# Patient Record
Sex: FEMALE | Race: AMERICAN INDIAN OR ALASKA NATIVE
[De-identification: names, ages, dates, MRNs, and addresses within clinical notes are randomized per-mention and may not be internally consistent; named-entity substitution may affect disease eponyms.]

---

## 2021-03-30 ENCOUNTER — HOSPITAL ENCOUNTER (EMERGENCY)
Dept: HOSPITAL 5 - ED | Age: 26
Discharge: HOME | End: 2021-03-30
Payer: MEDICAID

## 2021-03-30 VITALS — DIASTOLIC BLOOD PRESSURE: 82 MMHG | SYSTOLIC BLOOD PRESSURE: 115 MMHG

## 2021-03-30 DIAGNOSIS — Z79.899: ICD-10-CM

## 2021-03-30 DIAGNOSIS — O26.891: Primary | ICD-10-CM

## 2021-03-30 DIAGNOSIS — Z3A.00: ICD-10-CM

## 2021-03-30 DIAGNOSIS — R11.0: ICD-10-CM

## 2021-03-30 LAB
ALBUMIN SERPL-MCNC: 4 G/DL (ref 3.9–5)
ALT SERPL-CCNC: 9 UNITS/L (ref 7–56)
BASOPHILS # (AUTO): 0.1 K/MM3 (ref 0–0.1)
BASOPHILS NFR BLD AUTO: 0.7 % (ref 0–1.8)
BILIRUB UR QL STRIP: (no result)
BLOOD UR QL VISUAL: (no result)
BUN SERPL-MCNC: 6 MG/DL (ref 7–17)
BUN/CREAT SERPL: 9 %
CALCIUM SERPL-MCNC: 9.4 MG/DL (ref 8.4–10.2)
EOSINOPHIL # BLD AUTO: 0.2 K/MM3 (ref 0–0.4)
EOSINOPHIL NFR BLD AUTO: 1.7 % (ref 0–4.3)
HCT VFR BLD CALC: 41.4 % (ref 30.3–42.9)
HEMOLYSIS INDEX: 9
HGB BLD-MCNC: 13.6 GM/DL (ref 10.1–14.3)
LYMPHOCYTES # BLD AUTO: 3.6 K/MM3 (ref 1.2–5.4)
LYMPHOCYTES NFR BLD AUTO: 32.1 % (ref 13.4–35)
MCHC RBC AUTO-ENTMCNC: 33 % (ref 30–34)
MCV RBC AUTO: 79 FL (ref 79–97)
MONOCYTES # (AUTO): 0.9 K/MM3 (ref 0–0.8)
MONOCYTES % (AUTO): 7.6 % (ref 0–7.3)
PH UR STRIP: 7 [PH] (ref 5–7)
PLATELET # BLD: 294 K/MM3 (ref 140–440)
PROT UR STRIP-MCNC: (no result) MG/DL
RBC # BLD AUTO: 5.26 M/MM3 (ref 3.65–5.03)
RBC #/AREA URNS HPF: 1 /HPF (ref 0–6)
UROBILINOGEN UR-MCNC: < 2 MG/DL (ref ?–2)
WBC #/AREA URNS HPF: < 1 /HPF (ref 0–6)

## 2021-03-30 PROCEDURE — 85025 COMPLETE CBC W/AUTO DIFF WBC: CPT

## 2021-03-30 PROCEDURE — 80053 COMPREHEN METABOLIC PANEL: CPT

## 2021-03-30 PROCEDURE — 81001 URINALYSIS AUTO W/SCOPE: CPT

## 2021-03-30 PROCEDURE — 84703 CHORIONIC GONADOTROPIN ASSAY: CPT

## 2021-03-30 PROCEDURE — 83690 ASSAY OF LIPASE: CPT

## 2021-03-30 PROCEDURE — 36415 COLL VENOUS BLD VENIPUNCTURE: CPT

## 2021-03-30 NOTE — EMERGENCY DEPARTMENT REPORT
ED N/V/D HPI





- General


Chief complaint: Nausea/Vomiting/Diarrhea


Stated complaint: CHEST PAINS LT SIDE X7 DAYS


Time Seen by Provider: 03/30/21 16:33


Source: patient


Mode of arrival: Ambulatory


Limitations: No Limitations





- History of Present Illness


Initial comments: 





25-year-old obese F American female presents to the emergency department 

complaining of 1 to 2 weeks history of waxing and waning nausea with an 

occasional episode of vomiting.  States that it always preceded about 1 or 2 

weeks ago with a couple episodes of diarrhea which was associated with 

epigastric pain which lasted for about 1 to 1-day and then resolve however she 

is been unable to get rid of the continuous waves of nausea.  She states that 

she normally only feels like this when she is pregnant and had a suspicion 

today.  She reports no chest pain, no palpitations no current abdominal pain, no

fever, chills, sweats, hemoptysis, hematemesis hematochezia, dysuria, or 

hematuria.  She is also been experiencing headaches which has been associated 

with the nausea as well she reports no trauma, no blurred vision, no known 

hypertension, no visual disturbances, no scotomas, no ear pain or sore throat.


MD complaint: nausea


Associated Abdominal Pain: No


Radiation: none


Associated Symptoms: nausea/vomiting.  denies: myalgias, chest pain, cough, 

fever/chills, loss of appetite, shortness of breath, syncope, other





- Related Data


                                  Previous Rx's











 Medication  Instructions  Recorded  Last Taken  Type


 


Doxylamine Succinate/Vit B6 1 each PO TID #20 tablet. 03/30/21 Unknown Rx





[Diclegis Dr 10-10 mg Tablet]    











                                    Allergies











Allergy/AdvReac Type Severity Reaction Status Date / Time


 


No Known Allergies Allergy   Unverified 03/30/21 16:02














ED Review of Systems


ROS: 


Stated complaint: CHEST PAINS LT SIDE X7 DAYS


Other details as noted in HPI





Comment: All other systems reviewed and negative





ED Past Medical Hx





- Medications


Home Medications: 


                                Home Medications











 Medication  Instructions  Recorded  Confirmed  Last Taken  Type


 


Doxylamine Succinate/Vit B6 1 each PO TID #20 tablet. 03/30/21  Unknown Rx





[Diclegis Dr 10-10 mg Tablet]     














ED Physical Exam





- General


Limitations: No Limitations


General appearance: alert, in no apparent distress





- Head


Head exam: Present: atraumatic, normocephalic





- Eye


Eye exam: Present: normal appearance, PERRL, EOMI


Pupils: Present: normal accommodation





- ENT


ENT exam: Present: normal exam, normal orophraynx, mucous membranes moist





- Neck


Neck exam: Present: normal inspection





- Respiratory


Respiratory exam: Present: normal lung sounds bilaterally.  Absent: respiratory 

distress, wheezes, rhonchi





- Cardiovascular


Cardiovascular Exam: Present: regular rate, normal rhythm.  Absent: systolic 

murmur, diastolic murmur, rubs, gallop





- GI/Abdominal


GI/Abdominal exam: Present: soft, normal bowel sounds, other (No Javed sign, no

Rovsing sign, no Dominguez Rdz, no tenderness at McBurney's.  No suprapubic pain 

no radiating radiating pain to the shoulder).  Absent: tenderness, guarding, 

rebound, hyperactive bowel sounds, hypoactive bowel sounds, organomegaly, mass





- Extremities Exam


Extremities exam: Present: normal inspection, normal capillary refill





- Back Exam


Back exam: Present: normal inspection.  Absent: CVA tenderness (R), CVA 

tenderness (L)





- Neurological Exam


Neurological exam: Present: alert, oriented X3, CN II-XII intact, normal gait





- Psychiatric


Psychiatric exam: Present: normal affect, normal mood.  Absent: anxious, flat 

affect, homicidal ideation





- Skin


Skin exam: Present: warm, dry, intact, normal color.  Absent: rash, diaphoretic,

erythema, urticaria, petechiae





ED Course


                                   Vital Signs











  03/30/21 03/30/21





  16:01 19:44


 


Temperature 98.8 F 98.1 F


 


Pulse Rate 80 84


 


Respiratory 16 18





Rate  


 


Blood Pressure  115/82


 


Blood Pressure 125/74 





[Left]  


 


O2 Sat by Pulse 100 100





Oximetry  














ED Medical Decision Making





- Lab Data


Result diagrams: 


                                 03/30/21 16:36





                                 03/30/21 16:36


Critical care attestation.: 


If time is entered above; I have spent that time in minutes in the direct care 

of this critically ill patient, excluding procedure time.








ED Disposition


Clinical Impression: 


 Nausea, Pregnancy, Positive pregnancy test





Disposition: DC-01 TO HOME OR SELFCARE


Is pt being admited?: No


Does the pt Need Aspirin: No


Condition: Stable


Instructions:  Nausea, Adult, First Trimester of Pregnancy, Easy-to-Read, P

renatal Care, How a Baby Grows During Pregnancy


Prescriptions: 


Doxylamine Succinate/Vit B6 [Diclegis Dr 10-10 mg Tablet] 1 each PO TID #20 

tablet.


Referrals: 


[facility] [Provider Group] - 3-5 Days


PRIMARY CARE,MD [Primary Care Provider] - 3-5 Days

## 2021-03-30 NOTE — EVENT NOTE
ED Screening Note


ED Screening Note: 





epigastric abd discomfort


back pain 


nausea, one episode of vomiting 


PMHx sickle cell trait


no allergies to meds 


LNMP: currently on cycle 





This initial assessment/diagnostic orders/clinical plan/treatment(s) is/are 

subject to change based on patients health status, clinical progression and 

re-assessment by fellow clinical providers in the ED. Further treatment and 

workup at subsequent clinical providers discretion. Patient/guardian urged not 

to elope from the ED as their condition may be serious if not clinically 

assessed and managed. 





Initial orders include: 


labs, UA

## 2021-04-03 ENCOUNTER — HOSPITAL ENCOUNTER (EMERGENCY)
Dept: HOSPITAL 5 - ED | Age: 26
Discharge: HOME | End: 2021-04-03
Payer: MEDICAID

## 2021-04-03 VITALS — SYSTOLIC BLOOD PRESSURE: 126 MMHG | DIASTOLIC BLOOD PRESSURE: 62 MMHG

## 2021-04-03 DIAGNOSIS — Z3A.01: ICD-10-CM

## 2021-04-03 DIAGNOSIS — Z79.899: ICD-10-CM

## 2021-04-03 DIAGNOSIS — Z98.890: ICD-10-CM

## 2021-04-03 DIAGNOSIS — F17.200: ICD-10-CM

## 2021-04-03 DIAGNOSIS — O20.0: Primary | ICD-10-CM

## 2021-04-03 LAB
ALBUMIN SERPL-MCNC: 4.1 G/DL (ref 3.9–5)
ALT SERPL-CCNC: 9 UNITS/L (ref 7–56)
BASOPHILS # (AUTO): 0 K/MM3 (ref 0–0.1)
BASOPHILS NFR BLD AUTO: 0.4 % (ref 0–1.8)
BILIRUB UR QL STRIP: (no result)
BLOOD UR QL VISUAL: (no result)
BUN SERPL-MCNC: 6 MG/DL (ref 7–17)
BUN/CREAT SERPL: 9 %
CALCIUM SERPL-MCNC: 9.4 MG/DL (ref 8.4–10.2)
EOSINOPHIL # BLD AUTO: 0.1 K/MM3 (ref 0–0.4)
EOSINOPHIL NFR BLD AUTO: 0.4 % (ref 0–4.3)
HCT VFR BLD CALC: 40.7 % (ref 30.3–42.9)
HEMOLYSIS INDEX: 0
HGB BLD-MCNC: 13.6 GM/DL (ref 10.1–14.3)
LYMPHOCYTES # BLD AUTO: 2 K/MM3 (ref 1.2–5.4)
LYMPHOCYTES NFR BLD AUTO: 14.9 % (ref 13.4–35)
MCHC RBC AUTO-ENTMCNC: 34 % (ref 30–34)
MCV RBC AUTO: 77 FL (ref 79–97)
MONOCYTES # (AUTO): 0.9 K/MM3 (ref 0–0.8)
MONOCYTES % (AUTO): 6.9 % (ref 0–7.3)
MUCOUS THREADS #/AREA URNS HPF: (no result) /HPF
PH UR STRIP: 6 [PH] (ref 5–7)
PLATELET # BLD: 290 K/MM3 (ref 140–440)
RBC # BLD AUTO: 5.26 M/MM3 (ref 3.65–5.03)
RBC #/AREA URNS HPF: 2 /HPF (ref 0–6)
UROBILINOGEN UR-MCNC: < 2 MG/DL (ref ?–2)
WBC #/AREA URNS HPF: 3 /HPF (ref 0–6)

## 2021-04-03 PROCEDURE — 96361 HYDRATE IV INFUSION ADD-ON: CPT

## 2021-04-03 PROCEDURE — 99284 EMERGENCY DEPT VISIT MOD MDM: CPT

## 2021-04-03 PROCEDURE — 80053 COMPREHEN METABOLIC PANEL: CPT

## 2021-04-03 PROCEDURE — 76801 OB US < 14 WKS SINGLE FETUS: CPT

## 2021-04-03 PROCEDURE — 84702 CHORIONIC GONADOTROPIN TEST: CPT

## 2021-04-03 PROCEDURE — 81001 URINALYSIS AUTO W/SCOPE: CPT

## 2021-04-03 PROCEDURE — 83690 ASSAY OF LIPASE: CPT

## 2021-04-03 PROCEDURE — 83735 ASSAY OF MAGNESIUM: CPT

## 2021-04-03 PROCEDURE — 85025 COMPLETE CBC W/AUTO DIFF WBC: CPT

## 2021-04-03 PROCEDURE — 96374 THER/PROPH/DIAG INJ IV PUSH: CPT

## 2021-04-03 PROCEDURE — 36415 COLL VENOUS BLD VENIPUNCTURE: CPT

## 2021-04-03 NOTE — EMERGENCY DEPARTMENT REPORT
ED General Adult HPI





- General


Chief complaint: Nausea/Vomiting/Diarrhea


Stated complaint: PREGNANT,VOMITING/SEEN HERE 2 DAYS AGO


Time Seen by Provider: 21 12:26


Source: patient


Mode of arrival: Ambulatory


Limitations: No Limitations





- History of Present Illness


Initial comments: 





25-year-old female presents to the emergency room stating that she is pregnant 

she was had a positive pregnancy at this ER on 3/30/2021.  She states that she 

has had ongoing nausea fat now with vaginal bleeding which she describes as 

spotting not enough for her to use pad over the last 2 weeks she states the 

nausea causes her chest to hurt especially when she feels like she has to vomit.

 She denies any other symptoms no fever no coughing no shortness of breath.  She

is   5 para 3 AB 1.  She has not followed up with her PCP as yet


-: week(s)


Location: chest


Radiation: non-radiation


Worsens with: none


Associated Symptoms: nausea/vomiting.  denies: confusion, cough, fever/chills, 

loss of appetite, shortness of breath, syncope, weakness


Treatments Prior to Arrival: none





- Related Data


                                  Previous Rx's











 Medication  Instructions  Recorded  Last Taken  Type


 


Doxylamine Succinate/Vit B6 1 each PO TID #20 tablet. 21 Unknown Rx





[Diclegis Dr 10-10 mg Tablet]    


 


Ondansetron [Zofran Odt] 4 mg PO Q8HR PRN #20 tab.rebekadis 21 Unknown Rx











                                    Allergies











Allergy/AdvReac Type Severity Reaction Status Date / Time


 


No Known Allergies Allergy   Unverified 21 16:02














ED Review of Systems


ROS: 


Stated complaint: PREGNANT,VOMITING/SEEN HERE 2 DAYS AGO


Other details as noted in HPI








ED Past Medical Hx





- Past Medical History


Previous Medical History?: No





- Surgical History


Additional Surgical History: 





- Social History


Smoking Status: Current Every Day Smoker





- Medications


Home Medications: 


                                Home Medications











 Medication  Instructions  Recorded  Confirmed  Last Taken  Type


 


Doxylamine Succinate/Vit B6 1 each PO TID #20 tablet. 21  Unknown Rx





[Diclegis Dr 10-10 mg Tablet]     


 


Ondansetron [Zofran Odt] 4 mg PO Q8HR PRN #20 tab.dalton 21  Unknown Rx














ED Physical Exam





- General


Limitations: No Limitations


General appearance: alert, in no apparent distress





- Head


Head exam: Present: atraumatic, normal inspection





- Eye


Eye exam: Present: normal appearance





- ENT


ENT exam: Present: normal exam





- Neck


Neck exam: Present: normal inspection





- Respiratory


Respiratory exam: Present: normal lung sounds bilaterally, chest wall tenderness

(Anterior chest wall pain with palpation).  Absent: respiratory distress, 

wheezes





- Cardiovascular


Cardiovascular Exam: Present: regular rate, normal heart sounds





- GI/Abdominal


GI/Abdominal exam: Present: soft, normal bowel sounds.  Absent: distended, 

tenderness, guarding





- Extremities Exam


Extremities exam: Present: normal inspection, normal capillary refill





- Back Exam


Back exam: Present: normal inspection.  Absent: CVA tenderness (R), CVA 

tenderness (L)





- Neurological Exam


Neurological exam: Present: alert, oriented X3, normal gait





- Psychiatric


Psychiatric exam: Present: normal affect





- Skin


Skin exam: Present: warm, dry, intact





ED Course


                                   Vital Signs











  21





  12:10 17:13


 


Temperature 98.0 F 


 


Pulse Rate 105 H 82


 


Respiratory 18 18





Rate  


 


Blood Pressure 129/60 


 


Blood Pressure  126/62





[Right]  


 


O2 Sat by Pulse 98 99





Oximetry  














- Reevaluation(s)


Reevaluation #1: 





21 16:44


Patient in room sleeping comfortably had to be aroused she denies any current 

pain no chest pain no shortness of breath she received 1 L of IV fluids.  I 

observed her walking to the bathroom with steady gait and in no distress





ED Medical Decision Making





- Lab Data


Result diagrams: 


                                 21 14:08





                                 21 14:08





- Radiology Data


Radiology results: report reviewed





EXAMINATION: Obstetrical Ultrasound  


 


 INDICATION: Abdominal pain in early pregnancy  


 


 COMPARISON: None  


 


 FINDINGS:  


 


 There is a single, living intrauterine pregnancy.   


 


 Crown-rump length = 1.2 cm = 7 weeks, 1 day(s).  


 


 Fetal heart rate is 164 beats per minute.   


 


 The right adnexal region appears within normal limits. The left adnexal region 

is not well-


visualized. No free pelvic fluid is seen.  


 


 


 IMPRESSION:  


 1.  Single living intrauterine pregnancy with estimated gestational age of 7 

weeks, 1 day.  





Critical Care Time: No


Critical care attestation.: 


If time is entered above; I have spent that time in minutes in the direct care 

of this critically ill patient, excluding procedure time.








ED Disposition


Clinical Impression: 


 Threatened miscarriage in early pregnancy





Pregnancy


Qualifiers:


 Weeks of gestation: less than 8 weeks Qualified Code(s): Z3A.01 - Less than 8 

weeks gestation of pregnancy





Disposition: DC- TO HOME OR SELFCARE


Is pt being admited?: No


Does the pt Need Aspirin: No


Condition: Stable


Instructions:  Threatened Miscarriage, First Trimester of Pregnancy, 

Easy-to-Read


Additional Instructions: 


According to the ultrasound you are 7 weeks and 1 day pregnant your baby's heart

 rate is 164 bpm.  [place] [address].  Rest drink plenty fluids please follow-up

 with with your OB/GYN doctor as soon as possible I have given you the my OB/GYN

 group you can also give them a call to make an appointment at .  

Your urine shows no signs of infection.  Your blood work had slight changes 

which are nonspecific and could just be related to dehydration or viral causes. 

 Please follow-up with your OB/GYN or return to the emergency room for abdominal

 pain or heavy vaginal bleeding


Prescriptions: 


Ondansetron [Zofran Odt] 4 mg PO Q8HR PRN #20 tab.rapdis


 PRN Reason: Nausea


Referrals: 


PRIMARY CARE,MD [Primary Care Provider] - 3-5 Days


AIDA GIFFORD MD [Staff Physician] - 3-5 Days


Time of Disposition: 16:49

## 2021-04-03 NOTE — ULTRASOUND REPORT
EXAMINATION: Obstetrical Ultrasound



INDICATION: Abdominal pain in early pregnancy



COMPARISON: None



FINDINGS:



There is a single, living intrauterine pregnancy. 



Crown-rump length = 1.2 cm = 7 weeks, 1 day(s).



Fetal heart rate is 164 beats per minute. 



The right adnexal region appears within normal limits. The left adnexal region is not well-visualized
. No free pelvic fluid is seen.





IMPRESSION:

1.  Single living intrauterine pregnancy with estimated gestational age of 7 weeks, 1 day.



Signer Name: Mayra Corral MD 

Signed: 4/3/2021 2:10 PM

Workstation Name: [id number]

## 2021-05-22 ENCOUNTER — HOSPITAL ENCOUNTER (EMERGENCY)
Dept: HOSPITAL 5 - ED | Age: 26
LOS: 1 days | Discharge: HOME | End: 2021-05-23
Payer: SELF-PAY

## 2021-05-22 DIAGNOSIS — Z79.899: ICD-10-CM

## 2021-05-22 DIAGNOSIS — F12.90: ICD-10-CM

## 2021-05-22 DIAGNOSIS — M54.5: Primary | ICD-10-CM

## 2021-05-22 DIAGNOSIS — F17.200: ICD-10-CM

## 2021-05-22 DIAGNOSIS — G89.4: ICD-10-CM

## 2021-05-22 DIAGNOSIS — Z98.890: ICD-10-CM

## 2021-05-22 DIAGNOSIS — N39.0: ICD-10-CM

## 2021-05-22 DIAGNOSIS — R07.89: ICD-10-CM

## 2021-05-22 LAB
ALBUMIN SERPL-MCNC: 3.6 G/DL (ref 3.9–5)
ALT SERPL-CCNC: 19 UNITS/L (ref 7–56)
BACTERIA #/AREA URNS HPF: (no result) /HPF
BILIRUB UR QL STRIP: (no result)
BLOOD UR QL VISUAL: (no result)
BUN SERPL-MCNC: 6 MG/DL (ref 7–17)
BUN/CREAT SERPL: 5 %
CALCIUM SERPL-MCNC: 8.8 MG/DL (ref 8.4–10.2)
HCT VFR BLD CALC: 37.7 % (ref 30.3–42.9)
HEMOLYSIS INDEX: 1
HGB BLD-MCNC: 12.5 GM/DL (ref 10.1–14.3)
MCHC RBC AUTO-ENTMCNC: 33 % (ref 30–34)
MCV RBC AUTO: 76 FL (ref 79–97)
MUCOUS THREADS #/AREA URNS HPF: (no result) /HPF
PH UR STRIP: 5 [PH] (ref 5–7)
PLATELET # BLD: 294 K/MM3 (ref 140–440)
RBC # BLD AUTO: 4.94 M/MM3 (ref 3.65–5.03)
RBC #/AREA URNS HPF: 4 /HPF (ref 0–6)
UROBILINOGEN UR-MCNC: 2 MG/DL (ref ?–2)
WBC #/AREA URNS HPF: 60 /HPF (ref 0–6)

## 2021-05-22 PROCEDURE — 87086 URINE CULTURE/COLONY COUNT: CPT

## 2021-05-22 PROCEDURE — 81025 URINE PREGNANCY TEST: CPT

## 2021-05-22 PROCEDURE — 83735 ASSAY OF MAGNESIUM: CPT

## 2021-05-22 PROCEDURE — 36415 COLL VENOUS BLD VENIPUNCTURE: CPT

## 2021-05-22 PROCEDURE — 84702 CHORIONIC GONADOTROPIN TEST: CPT

## 2021-05-22 PROCEDURE — 99284 EMERGENCY DEPT VISIT MOD MDM: CPT

## 2021-05-22 PROCEDURE — 96372 THER/PROPH/DIAG INJ SC/IM: CPT

## 2021-05-22 PROCEDURE — 85027 COMPLETE CBC AUTOMATED: CPT

## 2021-05-22 PROCEDURE — 71046 X-RAY EXAM CHEST 2 VIEWS: CPT

## 2021-05-22 PROCEDURE — 81001 URINALYSIS AUTO W/SCOPE: CPT

## 2021-05-22 PROCEDURE — 80053 COMPREHEN METABOLIC PANEL: CPT

## 2021-05-22 NOTE — XRAY REPORT
CHEST 2 VIEWS 



INDICATION / CLINICAL INFORMATION:

COUGH, COVID LIKE SYMPTOMS.



COMPARISON: 

None available.



FINDINGS:



SUPPORT DEVICES: None.

HEART / MEDIASTINUM: No significant abnormality. 

LUNGS / PLEURA: No significant pulmonary or pleural abnormality. No pneumothorax. 



ADDITIONAL FINDINGS: No significant additional findings.



IMPRESSION:

1. No acute findings.



Signer Name: Sal Heath MD 

Signed: 5/22/2021 4:42 PM

Workstation Name: [id number]

## 2021-05-22 NOTE — EVENT NOTE
ED Screening Note


Date of service: 21


Time: 16:06


ED Screening Note: 








This initial assessment/diagnostic orders/clinical plan/treatment(s) is/are 

subject to change based on patients health status, clinical progression and re-

assessment by fellow clinical providers in the ED. Further treatment and workup 

at subsequent clinical providers discretion. Patient/guardian urged not to elope

from the ED as their condition may be serious if not clinically assessed and 

managed. 





Initial orders include: 


This is a 25-year-old obese female she is complaining of cough  headache 

nausea,vomiting diarrhea symptoms started 1 week ago. She denies any known Covid

sick contacts. She reports having an  1 month ago.  No current vaginal 

bleeding. she denies fever.  She is in no apparent distress and she denies any 

chronic medical conditions

## 2021-05-23 VITALS — DIASTOLIC BLOOD PRESSURE: 72 MMHG | SYSTOLIC BLOOD PRESSURE: 107 MMHG

## 2021-05-23 NOTE — EMERGENCY DEPARTMENT REPORT
ED General Adult HPI





- General


Chief complaint: Chest Pain


Stated complaint: CHEST PAIN, BACK PAIN, BLURRY VISION


Source: patient


Mode of arrival: Ambulatory


Limitations: No Limitations





- History of Present Illness


Initial comments: 





Patient is a 25-year-old -American female with a history of chronic low 

back pain and chronic costochondritis who presents to the ED with complaint of 

acute onset persistent worsening low back pain and suprapubic pain as well as 

chest pain.  Patient states that she is about 4 weeks s/p D&C procedure to 

evacuate a 9-week pregnancy and complains of worsening lower abdominal pain and 

low back pain since the procedure 4 weeks ago.  Patient denies fever, chills, 

dizziness, syncope, nausea and vomiting, chest pain, shortness of breath, cough,

vaginal discharge, vaginal bleeding, dysuria, change in vision, numbness and 

tingling or weakness of upper and lower extremities bilaterally or headache.


MD Complaint: Low back pain, lower abdominal pain, chest pain


-: Gradual, year(s) (2)


Location: chest, back (lower), abdomen (lower)


Radiation: non-radiation


Severity scale (0 -10): 5


Quality: aching, sharp


Consistency: constant


Improves with: none


Associated Symptoms: denies other symptoms, chest pain, nausea/vomiting.  

denies: confusion, cough, fever/chills, headaches, malaise, rash, seizure, 

shortness of breath, syncope


Treatments Prior to Arrival: none





- Related Data


                                  Previous Rx's











 Medication  Instructions  Recorded  Last Taken  Type


 


Doxylamine Succinate/Vit B6 1 each PO TID #20 tablet. 21 Unknown Rx





[Diclegis Dr 10-10 mg Tablet]    


 


Ondansetron [Zofran Odt] 4 mg PO Q8HR PRN #20 tab.rapdis 21 Unknown Rx


 


Baclofen 20 mg PO Q12H PRN #30 tablet 21 Unknown Rx


 


Ibuprofen [Motrin] 800 mg PO Q8HR PRN #30 tablet 21 Unknown Rx


 


Ondansetron [Zofran Odt] 4 mg PO Q8HR PRN #20 tab.rapdis 21 Unknown Rx


 


Sulfamethoxazole/Trimethoprim 1 each PO Q12H #20 tablet 21 Unknown Rx





[Bactrim DS TAB]    











                                    Allergies











Allergy/AdvReac Type Severity Reaction Status Date / Time


 


No Known Allergies Allergy   Verified 21 02:18














ED Review of Systems


ROS: 


Stated complaint: CHEST PAIN, BACK PAIN, BLURRY VISION


Other details as noted in HPI





Constitutional: denies: chills, fever


Eyes: denies: eye pain, eye discharge, vision change


ENT: denies: ear pain, throat pain


Respiratory: denies: cough, shortness of breath, wheezing


Cardiovascular: chest pain (anterior).  denies: palpitations


Endocrine: no symptoms reported


Gastrointestinal: abdominal pain (lower back pain), nausea.  denies: vomiting, 

diarrhea, constipation, hematemesis


Genitourinary: denies: urgency, dysuria, discharge


Musculoskeletal: back pain (lower back), arthralgia, myalgia.  denies: joint 

swelling


Skin: denies: rash, lesions


Neurological: denies: headache, weakness, paresthesias


Psychiatric: denies: anxiety, depression


Hematological/Lymphatic: denies: easy bleeding, easy bruising





ED Past Medical Hx





- Past Medical History


Previous Medical History?: Yes


Additional medical history:  2021





- Surgical History


Past Surgical History?: Yes


Additional Surgical History: ,  2021





- Social History


Smoking Status: Current Every Day Smoker


Substance Use Type: Alcohol, Marijuana





- Medications


Home Medications: 


                                Home Medications











 Medication  Instructions  Recorded  Confirmed  Last Taken  Type


 


Doxylamine Succinate/Vit B6 1 each PO TID #20 tablet. 21  Unknown Rx





[Diclegis Dr 10-10 mg Tablet]     


 


Ondansetron [Zofran Odt] 4 mg PO Q8HR PRN #20 tab.rapdis 21  Unknown Rx


 


Baclofen 20 mg PO Q12H PRN #30 tablet 21  Unknown Rx


 


Ibuprofen [Motrin] 800 mg PO Q8HR PRN #30 tablet 21  Unknown Rx


 


Ondansetron [Zofran Odt] 4 mg PO Q8HR PRN #20 tab.rapdis 21  Unknown Rx


 


Sulfamethoxazole/Trimethoprim 1 each PO Q12H #20 tablet 21  Unknown Rx





[Bactrim DS TAB]     














ED Physical Exam





- General


Limitations: No Limitations


General appearance: alert, in no apparent distress





- Head


Head exam: Present: atraumatic, normocephalic, normal inspection





- Eye


Eye exam: Present: normal appearance, PERRL, EOMI


Pupils: Present: normal accommodation





- ENT


ENT exam: Present: normal exam, normal orophraynx, mucous membranes moist, TM's 

normal bilaterally, normal external ear exam





- Neck


Neck exam: Present: normal inspection, full ROM





- Respiratory


Respiratory exam: Present: normal lung sounds bilaterally, chest wall tenderness

(palpable reproducible diffuse anterior chest wall tenderness).  Absent: 

respiratory distress, wheezes, rales, rhonchi, accessory muscle use, decreased 

breath sounds, prolonged expiratory





- Cardiovascular


Cardiovascular Exam: Present: normal rhythm, tachycardia, normal heart sounds.  

Absent: systolic murmur, diastolic murmur, rubs, gallop





- GI/Abdominal


GI/Abdominal exam: Present: soft, tenderness (Palpable mild suprapubic 

tenderness), normal bowel sounds.  Absent: guarding, rebound, hyperactive bowel 

sounds, hypoactive bowel sounds, organomegaly





- Extremities Exam


Extremities exam: Present: normal inspection, full ROM, normal capillary refill





- Back Exam


Back exam: Present: normal inspection, full ROM, tenderness (Palpable 

lumbosacral pain), muscle spasm, paraspinal tenderness.  Absent: CVA tenderness 

(R), vertebral tenderness





- Neurological Exam


Neurological exam: Present: alert, oriented X3, CN II-XII intact, normal gait, 

reflexes normal





- Psychiatric


Psychiatric exam: Present: normal affect, normal mood





- Skin


Skin exam: Present: warm, dry, intact, normal color.  Absent: rash





ED Course


                                   Vital Signs











  21





  16:08 01:50 02:42


 


Temperature 99.9 F H  


 


Pulse Rate 116 H 105 H 


 


Respiratory 22  16





Rate   


 


Blood Pressure 122/72 107/72 


 


O2 Sat by Pulse 96 100 





Oximetry   














  21





  03:40


 


Temperature 


 


Pulse Rate 87


 


Respiratory 





Rate 


 


Blood Pressure 


 


O2 Sat by Pulse 





Oximetry 














ED Medical Decision Making





- Lab Data


Result diagrams: 


                                 21 16:11





                                 21 16:11





- Radiology Data


Radiology results: report reviewed, image reviewed





[facility]  


                                     [address] [place] [address]  


 


                                            XRay Report   


                                               Signed  


 


Patient: MCKAY LAW                                                         

      MR#: [id number]  


51          


: 1995                                                                

Acct:[id number]      


 


Age/Sex: 25 / F                                                                

ADM Date: 21     


 


Loc: ED       


Attending Dr:   


 


 


Ordering Physician: LEIGH ANNANUEL PACHECOBC  


Date of Service: 21  


Procedure(s): XR chest routine 2V  


Accession Number(s): [id number]  


 


cc: ARMAAN MADDOX   


 


Fluoro Time In Minutes:   


 


CHEST 2 VIEWS   


 


 INDICATION / CLINICAL INFORMATION:  


 COUGH, COVID LIKE SYMPTOMS.  


 


 COMPARISON:   


 None available.  


 


 FINDINGS:  


 


 SUPPORT DEVICES: None.  


 HEART / MEDIASTINUM: No significant abnormality.   


 LUNGS / PLEURA: No significant pulmonary or pleural abnormality. No 

pneumothorax.   


 


 ADDITIONAL FINDINGS: No significant additional findings.  


 


 IMPRESSION:  


 1. No acute findings.  


 


 Signer Name: Sal Heath MD   


 Signed: 2021 4:42 PM  


 Workstation Name: [id number]   


 


 


Transcribed By: TL  


Dictated By: Sal Heath MD  


Electronically Authenticated By: Sal Heath MD    


Signed Date/Time: 21                                


 


 


 


DD/DT: 21                                                            

 


TD/TT:


Print





- Medical Decision Making





This is a 25-year-old -American female with a history of chronic low back

pain and chronic costochondritis who presents to the ED with complaint of acute 

onset persistent worsening low back pain and suprapubic pain as well as chest 

pain.  Patient states that she is about 4 weeks s/p D&C procedure to evacuate a 

9-week pregnancy and complains of worsening lower abdominal pain and low back 

pain since the procedure 4 weeks ago.  In the ED, patient is alert and oriented 

x3 and is not in any distress but appears to be in pain, crying during the 

physical exam, anxious and tachycardic.  Patient was treated for pain in the ED 

and also given antiemetics.  Lab test results were reviewed and showed acute 

leukocytosis of 16,000 and significant urinary tract infection.  The hCG quant 

was negative.  Patient was treated in the ED with antibiotics and on 

reevaluation, patient's pain is well controlled medication.  Patient will 

discharge home on pain medications and antibiotics and advised to follow-up with

her primary care physician or OB/GYN physician in 5 to 7 days for reevaluation. 

Patient is advised return to the ED immediately if symptoms get worse.





- Differential Diagnosis


Costochondritis; pneumonia; PE; ACS; UTI; ovarian cyst; muscle spasm


Critical care attestation.: 


If time is entered above; I have spent that time in minutes in the direct care 

of this critically ill patient, excluding procedure time.








ED Disposition


Clinical Impression: 


 Acute urinary tract infection, Acute nonspecific chest pain with low risk of 

coronary artery disease, Chronic pain syndrome





Chronic low back pain without sciatica


Qualifiers:


 Back pain laterality: bilateral Qualified Code(s): M54.5 - Low back pain





Disposition:  TO HOME OR SELFCARE


Is pt being admited?: No


Does the pt Need Aspirin: No


Condition: Stable


Instructions:  Chest Wall Pain, Easy-to-Read, Urinary Tract Infection, Adult, 

Easy-to-Read, Nonspecific Chest Pain, Adult, Easy-to-Read, Chronic Back Pain, 

Easy-to-Read, Chest Pain (ED)


Additional Instructions: 


All lab test results were reviewed and are all nonactionable except for acute 

leukocytosis of 16,000, which is likely due to a recent invasive procedure 

during your D&C.  The urinalysis also showed significant urinary tract infection

which could as well be the source of the leukocytosis.  The rest of the lab test

results were unremarkable including the hCG quant that shows that pregnancy is 

negative.  Therefore take medications with food, drink plenty of fluids and 

follow-up with your primary care physician in 5 to 7 days for reevaluation.  

Return to the ED immediately if symptoms get worse.


Prescriptions: 


Baclofen 20 mg PO Q12H PRN #30 tablet


 PRN Reason: Muscle Spasm


Sulfamethoxazole/Trimethoprim [Bactrim DS TAB] 1 each PO Q12H #20 tablet


Ibuprofen [Motrin] 800 mg PO Q8HR PRN #30 tablet


 PRN Reason: Pain , Severe (7-10)


Ondansetron [Zofran Odt] 4 mg PO Q8HR PRN #20 tab.rapdis


 PRN Reason: Nausea


Referrals: 


[facility] [Provider Group] - 3-5 Days


Time of Disposition: 01:50


Print Language: ENGLISH

## 2022-07-13 ENCOUNTER — HOSPITAL ENCOUNTER (EMERGENCY)
Dept: HOSPITAL 5 - ED | Age: 27
Discharge: HOME | End: 2022-07-13
Payer: SELF-PAY

## 2022-07-13 VITALS — DIASTOLIC BLOOD PRESSURE: 50 MMHG | SYSTOLIC BLOOD PRESSURE: 89 MMHG

## 2022-07-13 DIAGNOSIS — O21.2: Primary | ICD-10-CM

## 2022-07-13 DIAGNOSIS — F17.290: ICD-10-CM

## 2022-07-13 DIAGNOSIS — O26.892: ICD-10-CM

## 2022-07-13 DIAGNOSIS — Z3A.22: ICD-10-CM

## 2022-07-13 DIAGNOSIS — R52: ICD-10-CM

## 2022-07-13 DIAGNOSIS — Z98.890: ICD-10-CM

## 2022-07-13 LAB
ANISOCYTOSIS BLD QL SMEAR: (no result)
BAND NEUTROPHILS # (MANUAL): 0 K/MM3
BUN SERPL-MCNC: 2 MG/DL (ref 7–17)
BUN/CREAT SERPL: 3 %
CALCIUM SERPL-MCNC: 8.6 MG/DL (ref 8.4–10.2)
HCT VFR BLD CALC: 30.7 % (ref 30.3–42.9)
HEMOLYSIS INDEX: 0
HGB BLD-MCNC: 10.2 GM/DL (ref 10.1–14.3)
MCHC RBC AUTO-ENTMCNC: 33 % (ref 30–34)
MCV RBC AUTO: 81 FL (ref 79–97)
MYELOCYTES # (MANUAL): 0 K/MM3
PLATELET # BLD: 227 K/MM3 (ref 140–440)
PROMYELOCYTES # (MANUAL): 0 K/MM3
RBC # BLD AUTO: 3.78 M/MM3 (ref 3.65–5.03)
TOTAL CELLS COUNTED BLD: 100

## 2022-07-13 PROCEDURE — 36415 COLL VENOUS BLD VENIPUNCTURE: CPT

## 2022-07-13 PROCEDURE — 85025 COMPLETE CBC W/AUTO DIFF WBC: CPT

## 2022-07-13 PROCEDURE — 76805 OB US >/= 14 WKS SNGL FETUS: CPT

## 2022-07-13 PROCEDURE — 99284 EMERGENCY DEPT VISIT MOD MDM: CPT

## 2022-07-13 PROCEDURE — 96360 HYDRATION IV INFUSION INIT: CPT

## 2022-07-13 PROCEDURE — 85007 BL SMEAR W/DIFF WBC COUNT: CPT

## 2022-07-13 PROCEDURE — 80048 BASIC METABOLIC PNL TOTAL CA: CPT

## 2022-07-13 NOTE — ULTRASOUND REPORT
ULTRASOUND OBSTETRIC 



Indication:

abd pain



Findings:

There is a single intrauterine pregnancy. 



BPD = 5.1 cm = 21 weeks, 2 day(s).

Head circumference = 19.2 cm = 21 weeks, 3 day(s).

Abdominal circumference = 16.2 cm = 21 weeks, 2 day(s).

Femur length = 3.9 cm = 22 weeks, 4 day(s).



Overall estimated sonographic age = 21 weeks, 5 day(s).



Fetal heart rate is 152  beats per minute. 

Estimated fetal birth weight is 452  grams



Fetal position is breech. 

Cervix appears closed. 3.9 cm

Fetal movement is present. 

Placenta is posterior  and grade 0 . 

Amniotic fluid volume appears normal. 

Maternal adnexa appear normal.



Impression:

1. Single living intrauterine pregnancy with estimated sonographic age of 21 weeks, 5  day(s).

2. No sonographic abnormality identified.



Signer Name: Anand Gracia MD 

Signed: 7/13/2022 8:51 PM

Workstation Name: [id number]

## 2022-07-13 NOTE — EMERGENCY DEPARTMENT REPORT
ED General Adult HPI





- General


Chief complaint: Nausea/Vomiting/Diarrhea


Stated complaint: CHEST PAIN/BODY PAIN


Time Seen by Provider: 22 09:22


Source: EMS


Mode of arrival: Stretcher


Limitations: No Limitations





- History of Present Illness


Initial comments: 


Patient presents with body aches and pregnancy that is been going on for the 

last few days.  Patient states that she has not had any prenatal care she has 

some abdominal pain and back pain the pain is moderate.  She states that she is 

also been nauseous and has vomited once the vomit is nonbloody nonbilious.





Severity scale (0 -10): 7





- Related Data


                                  Previous Rx's











 Medication  Instructions  Recorded  Last Taken  Type


 


Doxylamine Succinate/Vit B6 1 each PO TID #20 tablet. 21 Unknown Rx





[Diclegis Dr 10-10 mg Tablet]    


 


Ondansetron [Zofran Odt] 4 mg PO Q8HR PRN #20 tab.rapdis 21 Unknown Rx


 


Baclofen 20 mg PO Q12H PRN #30 tablet 21 Unknown Rx


 


Ibuprofen [Motrin] 800 mg PO Q8HR PRN #30 tablet 21 Unknown Rx


 


Ondansetron [Zofran Odt] 4 mg PO Q8HR PRN #20 tab.rapdis 21 Unknown Rx


 


Sulfamethoxazole/Trimethoprim 1 each PO Q12H #20 tablet 21 Unknown Rx





[Bactrim DS TAB]    











                                    Allergies











Allergy/AdvReac Type Severity Reaction Status Date / Time


 


No Known Allergies Allergy   Verified 21 02:18














ED Review of Systems


ROS: 


Stated complaint: CHEST PAIN/BODY PAIN


Other details as noted in HPI





Constitutional: denies: chills, fever


Eyes: denies: eye pain, eye discharge, vision change


ENT: denies: ear pain, throat pain


Respiratory: denies: cough, shortness of breath, wheezing


Cardiovascular: denies: chest pain, palpitations


Endocrine: no symptoms reported


Gastrointestinal: abdominal pain, nausea.  denies: diarrhea


Genitourinary: denies: urgency, dysuria, discharge


Musculoskeletal: back pain.  denies: joint swelling, arthralgia


Skin: denies: rash, lesions


Neurological: denies: headache, weakness, paresthesias


Psychiatric: denies: anxiety, depression


Hematological/Lymphatic: denies: easy bleeding, easy bruising





ED Past Medical Hx





- Past Medical History


Additional medical history:  2021





- Surgical History


Additional Surgical History: ,  2021





- Social History


Smoking Status: Smoker, Current Status Unknown


Substance Use Type: None





- Medications


Home Medications: 


                                Home Medications











 Medication  Instructions  Recorded  Confirmed  Last Taken  Type


 


Doxylamine Succinate/Vit B6 1 each PO TID #20 tablet. 21  Unknown Rx





[Diclegis Dr 10-10 mg Tablet]     


 


Ondansetron [Zofran Odt] 4 mg PO Q8HR PRN #20 tab.rapdis 21  Unknown Rx


 


Baclofen 20 mg PO Q12H PRN #30 tablet 21  Unknown Rx


 


Ibuprofen [Motrin] 800 mg PO Q8HR PRN #30 tablet 21  Unknown Rx


 


Ondansetron [Zofran Odt] 4 mg PO Q8HR PRN #20 tab.rapdis 21  Unknown Rx


 


Sulfamethoxazole/Trimethoprim 1 each PO Q12H #20 tablet 21  Unknown Rx





[Bactrim DS TAB]     














ED Physical Exam





- General


Limitations: No Limitations


General appearance: alert, in no apparent distress





- Head


Head exam: Present: atraumatic, normocephalic





- Eye


Eye exam: Present: normal appearance





- ENT


ENT exam: Present: mucous membranes moist





- Neck


Neck exam: Present: normal inspection





- Respiratory


Respiratory exam: Present: normal lung sounds bilaterally.  Absent: respiratory 

distress





- Cardiovascular


Cardiovascular Exam: Present: regular rate, normal rhythm.  Absent: systolic 

murmur, diastolic murmur, rubs, gallop





- GI/Abdominal


GI/Abdominal exam: Present: soft, normal bowel sounds, other (Gravid uterus)





- Extremities Exam


Extremities exam: Present: normal inspection





- Back Exam


Back exam: Present: normal inspection





- Neurological Exam


Neurological exam: Present: alert, oriented X3





- Psychiatric


Psychiatric exam: Present: normal affect, normal mood





- Skin


Skin exam: Present: warm, dry, intact, normal color.  Absent: rash





ED Course


                                   Vital Signs











  22





  08:45 09:05 09:08


 


Temperature  100.2 F H 100.2 F H


 


Pulse Rate 118 H 115 H 118 H


 


Respiratory 16 20 20





Rate   


 


Blood Pressure   89/50


 


Blood Pressure 122/70 89/50 





[Left]   


 


O2 Sat by Pulse 98 98 97





Oximetry   














  22





  09:12 09:16 09:30


 


Temperature   


 


Pulse Rate  117 H 114 H


 


Respiratory 20 12 17





Rate   


 


Blood Pressure   


 


Blood Pressure   





[Left]   


 


O2 Sat by Pulse 99 100 99





Oximetry   














  22





  09:40 10:28 11:16


 


Temperature   


 


Pulse Rate  117 H 


 


Respiratory  12 22





Rate   


 


Blood Pressure   


 


Blood Pressure   





[Left]   


 


O2 Sat by Pulse 96 100 





Oximetry   














- Reevaluation(s)


Reevaluation #1: 





22 13:01


Patient is feeling better and is able to tolerate p.o. I will discharge patient.





ED Medical Decision Making





- Lab Data


Result diagrams: 


                                 22 09:36





                                 22 09:36








                                   Lab Results











  22 Range/Units





  09:36 09:36 


 


WBC  8.8   (4.5-11.0)  K/mm3


 


RBC  3.78   (3.65-5.03)  M/mm3


 


Hgb  10.2   (10.1-14.3)  gm/dl


 


Hct  30.7   (30.3-42.9)  %


 


MCV  81   (79-97)  fl


 


MCH  27 L   (28-32)  pg


 


MCHC  33   (30-34)  %


 


RDW  14.2   (13.2-15.2)  %


 


Plt Count  227   (140-440)  K/mm3


 


Add Manual Diff  Complete   


 


Total Counted  100   


 


Seg Neutrophils %  Np   


 


Seg Neuts % (Manual)  97.0 H   (40.0-70.0)  %


 


Band Neutrophils %  0   %


 


Lymphocytes % (Manual)  1.0 L   (13.4-35.0)  %


 


Reactive Lymphs % (Man)  0   %


 


Monocytes % (Manual)  2.0   (0.0-7.3)  %


 


Eosinophils % (Manual)  0   (0.0-4.3)  %


 


Basophils % (Manual)  0   (0.0-1.8)  %


 


Metamyelocytes %  0   %


 


Myelocytes %  0   %


 


Promyelocytes %  0   %


 


Blast Cells %  0   %


 


Nucleated RBC %  Not Reportable   


 


Seg Neutrophils # Man  8.5 H   (1.8-7.7)  K/mm3


 


Band Neutrophils #  0.0   K/mm3


 


Lymphocytes # (Manual)  0.1 L   (1.2-5.4)  K/mm3


 


Abs React Lymphs (Man)  0.0   K/mm3


 


Monocytes # (Manual)  0.2   (0.0-0.8)  K/mm3


 


Eosinophils # (Manual)  0.0   (0.0-0.4)  K/mm3


 


Basophils # (Manual)  0.0   (0.0-0.1)  K/mm3


 


Metamyelocytes #  0.0   K/mm3


 


Myelocytes #  0.0   K/mm3


 


Promyelocytes #  0.0   K/mm3


 


Blast Cells #  0.0   K/mm3


 


WBC Morphology  Not Reportable   


 


Hypersegmented Neuts  Not Reportable   


 


Hyposegmented Neuts  Few   


 


Hypogranular Neuts  Not Reportable   


 


Smudge Cells  Not Reportable   


 


Toxic Granulation  Not Reportable   


 


Toxic Vacuolation  Not Reportable   


 


Dohle Bodies  Not Reportable   


 


Pelger-Huet Anomaly  Not Reportable   


 


Chloé Rods  Not Reportable   


 


Platelet Estimate  Consistent w auto   


 


Clumped Platelets  Not Reportable   


 


Plt Clumps, EDTA  Not Reportable   


 


Large Platelets  Rare   


 


Giant Platelets  Not Reportable   


 


Platelet Satelliting  Not Reportable   


 


Plt Morphology Comment  Not Reportable   


 


RBC Morphology  Not Reportable   


 


Dimorphic RBCs  Not Reportable   


 


Polychromasia  Not Reportable   


 


Hypochromasia  Few   


 


Poikilocytosis  Few   


 


Anisocytosis  1+   


 


Microcytosis  1+   


 


Macrocytosis  Not Reportable   


 


Spherocytes  Rare   


 


Pappenheimer Bodies  Not Reportable   


 


Sickle Cells  Not Reportable   


 


Target Cells  Not Reportable   


 


Tear Drop Cells  Not Reportable   


 


Ovalocytes  Not Reportable   


 


Stomatocytes  Rare   


 


Helmet Cells  Not Reportable   


 


[place] Bodies  Not Reportable   


 


Cabot Rings  Not Reportable   


 


Jaiden Cells  Not Reportable   


 


Bite Cells  Not Reportable   


 


Crenated Cell  Not Reportable   


 


Elliptocytes  Not Reportable   


 


Acanthocytes (Spur)  Not Reportable   


 


Rouleaux  Not Reportable   


 


Hemoglobin C Crystals  Not Reportable   


 


Schistocytes  Not Reportable   


 


Malaria parasites  Not Reportable   


 


Leander Bodies  Not Reportable   


 


Hem Pathologist Commnt  No   


 


Sodium   138  (137-145)  mmol/L


 


Potassium   3.3 L  (3.6-5.0)  mmol/L


 


Chloride   104.5  ()  mmol/L


 


Carbon Dioxide   22  (22-30)  mmol/L


 


Anion Gap   15  mmol/L


 


BUN   2 L  (7-17)  mg/dL


 


Creatinine   0.6  (0.6-1.2)  mg/dL


 


Estimated GFR   > 60  ml/min


 


BUN/Creatinine Ratio   3  %


 


Glucose   90  ()  mg/dL


 


Calcium   8.6  (8.4-10.2)  mg/dL














- Medical Decision Making





Chief medical diagnosis: Influenza


Differential medical diagnosis COVID-19, hyperemesis gravidarum


OB ultrasound I will get a urinalysis I will get CBC BMP and I will reevaluate 

the patient


Critical care attestation.: 


If time is entered above; I have spent that time in minutes in the direct care 

of this critically ill patient, excluding procedure time.








ED Disposition


Clinical Impression: 


 Body aches





Vomiting


Qualifiers:


 Vomiting type: unspecified Nausea presence: unspecified Qualified Code(s): 

R11.10 - Vomiting, unspecified





Pregnant


Qualifiers:


 Weeks of gestation: 21 weeks Qualified Code(s): Z3A.21 - 21 weeks gestation of 

pregnancy





Disposition: 01 HOME / SELF CARE / HOMELESS


Is pt being admited?: No


Does the pt Need Aspirin: No


Condition: Stable


Instructions:  First Trimester of Pregnancy, Easy-to-Read, Nausea and Vomiting, 

Adult, Easy-to-Read


Referrals: 


PRIMARY CARE,MD [Primary Care Provider] - 3-5 Days


DANNY SANCHEZ MD [Staff Physician] - 3-5 Days

## 2023-08-29 ENCOUNTER — OFFICE VISIT (OUTPATIENT)
Dept: URBAN - METROPOLITAN AREA CLINIC 84 | Facility: CLINIC | Age: 28
End: 2023-08-29

## 2023-09-12 ENCOUNTER — OFFICE VISIT (OUTPATIENT)
Dept: URBAN - METROPOLITAN AREA CLINIC 84 | Facility: CLINIC | Age: 28
End: 2023-09-12